# Patient Record
Sex: MALE | Race: BLACK OR AFRICAN AMERICAN | NOT HISPANIC OR LATINO | URBAN - NONMETROPOLITAN AREA
[De-identification: names, ages, dates, MRNs, and addresses within clinical notes are randomized per-mention and may not be internally consistent; named-entity substitution may affect disease eponyms.]

---

## 2022-03-16 ENCOUNTER — OFFICE (OUTPATIENT)
Dept: URBAN - NONMETROPOLITAN AREA CLINIC 5 | Facility: CLINIC | Age: 52
End: 2022-03-16

## 2022-03-16 VITALS
SYSTOLIC BLOOD PRESSURE: 163 MMHG | WEIGHT: 302 LBS | HEIGHT: 76 IN | DIASTOLIC BLOOD PRESSURE: 96 MMHG | RESPIRATION RATE: 20 BRPM | HEART RATE: 72 BPM

## 2022-03-16 DIAGNOSIS — K85.90 ACUTE PANCREATITIS WITHOUT NECROSIS OR INFECTION, UNSPECIFIE: ICD-10-CM

## 2022-03-16 DIAGNOSIS — K59.09 OTHER CONSTIPATION: ICD-10-CM

## 2022-03-16 PROCEDURE — 99204 OFFICE O/P NEW MOD 45 MIN: CPT | Performed by: INTERNAL MEDICINE

## 2022-03-16 NOTE — SERVICEHPINOTES
Nikolas Valente   is a   50 yo  male  with a past medical history of arthritis, AFib, KYLE, HTN, and obesity who presents to establish care after an episode of acute pancreatitis.  Patient was hospitalized last week overnight for an episode of acute pancreatitis.  He reports a similar episode in 2008 at which time he was hospitalized for 4-5 days.  He has had no other flares or hospitalizations for pancreatitis.  He denies any family history of pancreatitis or pancreatic cancer.  He does smoke and occasionally drink alcohol as well as occasionally smoke marijuana.  He denies any NSAID use.  He works in a furniture factory.  He denies any nausea, vomiting, heartburn, or dysphagia.  He was experiencing some constipation and was started on MiraLax and Senokot.  This has since improved.  He has never attempted fiber for regularity of his bowel habits.  He denies any recent COVID exposure or sick contacts.  Patient has never undergone a colonoscopy.  Patient did have a CT scan while hospitalized that showed inflammatory stranding around his pancreas consistent with pancreatitis.  No other concerning features were found.

## 2022-03-16 NOTE — SERVICENOTES
Given patient's 2nd episode of acute pancreatitis will plan to obtain an MRCP as well as blood work as above.  Counseled him if with recurrent episode he would need to obtain these labs while having a flare.  In regards patient's constipation counseled to continue on fiber as outlined above.  He can also use MiraLax and stool softeners as needed.  Will plan to see back in 3 months.